# Patient Record
Sex: FEMALE | Race: WHITE
[De-identification: names, ages, dates, MRNs, and addresses within clinical notes are randomized per-mention and may not be internally consistent; named-entity substitution may affect disease eponyms.]

---

## 2017-01-03 NOTE — DIAGNOSTIC IMAGING REPORT
Brain MRI WITHOUT CONTRAST



HISTORY: Mental status change  G81.94 Left [id number]



TECHNIQUE: Multiplanar multisequence MRI of the brain was performed without the

use of contrast.



COMPARISON STUDY:  None.



FINDINGS: There are no areas of restricted diffusion to suggest acute

infarction. The midline structures are intact. The paranasal sinuses are clear.

The mastoid air cells are clear. The ventricles and sulci are within normal

limits for age. There is no mass, hematoma, midline shift. The major vascular

flow-voids at the skull base are well maintained. Moderate chronic small vessel

change consistent with age. Sella and parasellar regions are unremarkable.



IMPRESSION:  



1. No acute intracranial abnormality.





2. Mild to moderate chronic small vessel change throughout both cerebral

hemispheres.

3. No acute intracranial abnormality 







Electronically signed by:  Venancio Elias M.D.

1/3/2017 12:49 PM

## 2017-03-07 NOTE — SWALLOWING EVALUATION
HISTORY:  This 76 year-old woman was referred for a VFSS at [facility] 
in order to rule out aspiration.  The patient reports that she feels food and liquid 
getting "stuck" in her throat and that at times it causes her to cough.  Food that become 
stuck are more ticker or pasty type foods such as mashed potatoes and applesauce.  She 
also reports she will have episodes of vomiting associated with this.  While she eats, she 
needs to drink plenty of water.  The patient has a PMH significant for GERD.  She 
participated in an EGD on 6/27/16 which found a moderately sized Schatzki's ring (dilated) 
and a small hiatal hernia.  Her current diet is regular.  



PROCEDURE:  The patient was seen in the Radiology Department of [facility] for the VFSS.  Cursory examination of the oral cavity revealed natural dentition in 
fair condition.  Movement of the articulators was wnl.  



The patient was seated upright on a stool and was viewed in both the Anterior-Posterior 
(A-P) and Lateral planes.  Volitional phonation exercises completed in the A-P plane 
revealed bilateral vocal fold movement and vocal intensity to be wnl.  



In the lateral plane, the patient was given the following boluses:  1 tsp. thin liquid 
barium x 2, single swallow thin liquid barium self-presented from a cup, serial swallows 
of thin liquid barium self-presented via straw, 1 tsp. nectar-thick liquid barium, single 
swallow nectar-thick liquid barium self-presented from a cup, 1 tsp. barium pudding, and 1 
club cracker with barium paste.



The patient was then repositioned into the A-P plane and given the following boluses:  1 
tsp. nectar-thick liquid barium and 1 tsp. barium pudding.





RESULTS:  

Oral Stage:  Lip closure was adequate.  The patient was able to maintain a cohesive liquid 
bolus upon command during the liquid bolus hold task.  Mastication was slow and prolonged. 
  Lingual motion for bolus transport was also slow to initiate and disorganized.  
Piecemeal like deglutition was observed.   There was retention lining the tongue after the 
swallow.  The initiation of the pharyngeal swallow was delayed, and triggered when the 
bolus head reached the pyriforms.



Pharyngeal Stage:  Soft palate elevation was complete.  Laryngeal elevation revealed 
partial superior movement of the thyroid cartilage and partial approximation of the 
arytenoids to the epiglottic base.  Anterior hyoid excursion was partially reduced.   
Epiglottic deflection was incomplete and did not invert past the horizontal position.  
Laryngeal vestibular closure was incomplete, with a narrow column of air (scant amounts of 
contrast) being located in the vestibule at the height of the swallow.  The pharyngeal 
stripping wave was present and incomplete.  The opening to the pharyngoesophageal segment 
was also partially reduced, with partial distention and duration of the opening and 
partial obstruction of bolus flow.  Tongue base retraction was reduced, with a wide column 
of contrast being located between the tongue base and pharyngeal wall during the swallow.  
There was retention located along the tongue base, in the valleculae, along the 
aryepiglottic folds, and in the pyriforms after the swallow.  



There was no evidence of aspiration during this study. Scant amounts of laryngeal 
penetration were noted with thin liquids only.  The patient did have a weak swallow in 
general, with reduced ROM.  This resulted in retention throughout the pharynx after the 
initial swallow.  She did have good sensation to this, and elicited multiple effortful 
swallows to clear the pharynx.  This was effective in general, but needed 3-4 swallow to 
clear, in particular as viscosity increased.  A liquid wash also assisted.  



Esophageal stage:  There was evidence mid-distal esophageal retention.  A liquid wash did 
assist, but was not effective to completely clearing the retention.  Osteophytes were also 
present along with a suspected emerging cricopharyngeus impression.  



SUMMARY/RECOMMENDATIONS:  This patient presents mild-moderate oral-pharyngeal dysphagia.  
She also presents with esophageal dysfunction.  The following is recommended:

  1. Moist soft diet, thin liquids.  Use of sauces and gravies with meals.  

  2. Aspiration and GERD precautions, straws OK.  Fully upright for all p.o. intake and 
for 30-60 minutes after meals.  HOB elevated to at least 30 degrees at all times, even 
while asleep.  

  3. Safe swallow strategies:  Avoid foods of increased viscosity.  Alternate solids and 
liquids, rest breaks as needed.    

  4. Would benefit from outpatient speech therapy services for dysphagia, with focus on 
overall pharyngeal strengthening (to include but not limited to, Michelle maneuver, 
Mendelsohn Maneuver, effortful swallowing etc).  Would also benefit from further education 
and training on diet consistency and safe swallow strategies.  



A summary of the results and recommendations was discussed with the patient immediately 
following the study with verbal understanding.    



Thank you for referral of this patient.  Please contact me at [phone] if any 
additional information is needed.

## 2017-03-07 NOTE — DIAGNOSTIC IMAGING REPORT
VIDEO SWALLOW STUDY



CLINICAL HISTORY: Dysphagia.



COMPARISON STUDY: No priors.



Fluoroscopy time: 2.5 minutes.



FINDINGS: Fluoroscopic guidance was provided to the Department of Speech

Pathology and performing a video swallow study. The patient consumed

barium-impregnated pudding, cracker with paste, nectar thick liquid, and thin

barium while the swallowing mechanism was observed in real-time. No penetration

or aspiration was seen with any of the sampled textures. Esophageal dysmotility

is observed.





IMPRESSION:



1. No penetration or aspiration was identified with any of the sampled textures.



2. Esophageal dysmotility.



3. See dedicated speech pathology report for detailed findings and

recommendations.











Dictated:  3/7/2017 1:47 PM

Transcribed:  3/7/2017 2:01 PM

[place]







Electronically signed by:  Rich Robles M.D.

3/7/2017 2:38 PM



Dictated Date/Time:  3/7/2017 1:47 PM

## 2017-03-22 NOTE — CODING QUERY MEDICAL NECESSITY
SUPPORTING DIAGNOSIS NEEDED



Dr. Beard,



A supporting diagnosis is required for the test/procedure performed on this patient in 
order for us to be reimbursed by the patient's insurance. Please provide a supporting 
diagnosis for the following test/procedure listed below next to the test name along with 
your signature. 



*If there is no additional diagnosis for this patient that would support the following 
test/procedure please document that below next to the test/procedure.



Test(s)/Procedure(s) that require a supporting diagnosis:





* ([id number]) VITAMIN D ASSAY          DIAGNOSIS:





***DATE OF SERVICE: 3/7/17***





Provider Signature:  ______________________________  Date:  _______



Thank you  

Pavel Ridley

[facility] Information Management

Phone:  [phone]

Fax:  [phone]



Once completed, please kindly fax back to [phone]



For questions please call [phone]

## 2017-08-23 NOTE — MAMMOGRAPHY REPORT
BILATERAL DIGITAL SCREENING MAMMOGRAM WITH CAD: 8/23/2017

CLINICAL HISTORY: Routine screening.  Patient has no complaints.  





TECHNIQUE: Bilateral CC and MLO views were obtained.  Current study was also evaluated with a Compute
r Aided Detection (CAD) system.  



COMPARISON: Comparison is made to exams dated:  8/22/2016 mammogram, 8/20/2015 mammogram - [facility] [place], 2/26/2014 mammogram, 2/25/2013 mammogram, 10/24/2011 mammogram, and 10/22/2010 ma
mmogram.   



BREAST COMPOSITION:  The tissue of both breasts is heterogeneously dense, which may obscure small mas
ses.  



FINDINGS: There are scattered stable benign rim and round microcalcifications.  No new suspicious mas
s, architectural distortion or cluster of microcalcifications is seen.  



IMPRESSION:  ACR BI-RADS CATEGORY 1: NEGATIVE

There is no mammographic evidence of malignancy. A 1 year screening mammogram is recommended.  The pa
tient will receive written notification of the results.  





Approximately 10% of breast cancers are not detected with mammography. A negative mammographic report
 should not delay biopsy if a clinically suggestive mass is present.



Mitzy Veliz M.D.          

ay/:8/23/2017 10:37:54  



Imaging Technologist: Mary DA SILVA(R)(M), [facility]

letter sent: Normal 1/2  

BI-RADS Code: ACR BI-RADS Category 1: Negative

## 2017-12-04 ENCOUNTER — HOSPITAL ENCOUNTER (OUTPATIENT)
Dept: HOSPITAL 45 - C.MAMM | Age: 77
Discharge: HOME | End: 2017-12-04
Attending: FAMILY MEDICINE
Payer: COMMERCIAL

## 2017-12-04 DIAGNOSIS — M85.89: Primary | ICD-10-CM

## 2017-12-31 ENCOUNTER — HOSPITAL ENCOUNTER (EMERGENCY)
Dept: HOSPITAL 45 - C.EDB | Age: 77
Discharge: HOME | End: 2017-12-31
Payer: COMMERCIAL

## 2017-12-31 VITALS — DIASTOLIC BLOOD PRESSURE: 81 MMHG | HEART RATE: 93 BPM | SYSTOLIC BLOOD PRESSURE: 164 MMHG | OXYGEN SATURATION: 95 %

## 2017-12-31 VITALS
WEIGHT: 154.32 LBS | BODY MASS INDEX: 22.09 KG/M2 | BODY MASS INDEX: 22.09 KG/M2 | HEIGHT: 70 IN | HEIGHT: 70 IN | WEIGHT: 154.32 LBS

## 2017-12-31 VITALS — TEMPERATURE: 97.34 F

## 2017-12-31 DIAGNOSIS — Z87.891: ICD-10-CM

## 2017-12-31 DIAGNOSIS — N13.30: ICD-10-CM

## 2017-12-31 DIAGNOSIS — E87.6: ICD-10-CM

## 2017-12-31 DIAGNOSIS — N23: Primary | ICD-10-CM

## 2017-12-31 DIAGNOSIS — Z90.710: ICD-10-CM

## 2017-12-31 DIAGNOSIS — Z79.82: ICD-10-CM

## 2017-12-31 LAB
ALBUMIN SERPL-MCNC: 3.6 GM/DL (ref 3.4–5)
ALP SERPL-CCNC: 74 U/L (ref 45–117)
ALT SERPL-CCNC: 35 U/L (ref 12–78)
AST SERPL-CCNC: 26 U/L (ref 15–37)
BASOPHILS # BLD: 0.01 K/UL (ref 0–0.2)
BASOPHILS NFR BLD: 0.2 %
BUN SERPL-MCNC: 19 MG/DL (ref 7–18)
CALCIUM SERPL-MCNC: 8.6 MG/DL (ref 8.5–10.1)
CO2 SERPL-SCNC: 28 MMOL/L (ref 21–32)
CREAT SERPL-MCNC: 0.94 MG/DL (ref 0.6–1.2)
EOS ABS #: 0.2 K/UL (ref 0–0.5)
EOSINOPHIL NFR BLD AUTO: 195 K/UL (ref 130–400)
GLUCOSE SERPL-MCNC: 114 MG/DL (ref 70–99)
HCT VFR BLD CALC: 36.5 % (ref 37–47)
HGB BLD-MCNC: 12.7 G/DL (ref 12–16)
IG#: 0.01 K/UL (ref 0–0.02)
IMM GRANULOCYTES NFR BLD AUTO: 27 %
LIPASE: 62 U/L (ref 73–393)
LYMPHOCYTES # BLD: 1.42 K/UL (ref 1.2–3.4)
MCH RBC QN AUTO: 32.2 PG (ref 25–34)
MCHC RBC AUTO-ENTMCNC: 34.8 G/DL (ref 32–36)
MCV RBC AUTO: 92.4 FL (ref 80–100)
MONO ABS #: 0.27 K/UL (ref 0.11–0.59)
MONOCYTES NFR BLD: 5.1 %
NEUT ABS #: 3.34 K/UL (ref 1.4–6.5)
NEUTROPHILS # BLD AUTO: 3.8 %
NEUTROPHILS NFR BLD AUTO: 63.7 %
PMV BLD AUTO: 9.8 FL (ref 7.4–10.4)
POTASSIUM SERPL-SCNC: 3.4 MMOL/L (ref 3.5–5.1)
PROT SERPL-MCNC: 6.8 GM/DL (ref 6.4–8.2)
RED CELL DISTRIBUTION WIDTH CV: 12.7 % (ref 11.5–14.5)
RED CELL DISTRIBUTION WIDTH SD: 43 FL (ref 36.4–46.3)
SODIUM SERPL-SCNC: 140 MMOL/L (ref 136–145)
WBC # BLD AUTO: 5.25 K/UL (ref 4.8–10.8)

## 2017-12-31 NOTE — EMERGENCY ROOM VISIT NOTE
History


Report prepared by Kelsiibdenis:  Teresa Navarro


Under the Supervision of:  AMANDA TravisO.


First contact with patient:  16:11


Chief Complaint:  ABDOMINAL PAIN


Stated Complaint:  PAIN IN LOWER ABDOMEN QUADRANT, CHILLS, WEAK





History of Present Illness


The patient is a 77 year old female who presents to the Emergency Room with 

complaints of intermittent LLQ abdominal pain that started earlier this evening 

as she was cooking dinner. She rates her discomfort as a 5/10 in severity and 

describes it as feeling "achey" in nature. She reports the pain initially 

subsided, so she started to eat dinner, then she became weak, diaphoretic and 

started experiencing the chills. She also experienced nausea but has not 

vomited. She has undergone a hysterectomy in the past but still has her 

gallbladder and appendix. Her last BM was last night and normal. Patient denies 

headache, change in vision, fevers above 100.4 degrees, chest pain, shortness 

of breath, diarrhea, pain with urination, acute weakness in the arms or legs, 

rashes and melena. The patient also denies any recent sick contacts.





   Source of History:  patient


   Onset:  PTA


   Position:  abdomen (LLQ)


   Symptom Intensity:  5/10


   Quality:  ache


   Timing:  intermittent


   Associated Symptoms:  + chills, + diaphoresis, + nausea, + weakness (patient 

feels generally weak, but denies any acute weakness in the arms or legs), No 

fevers, No headache, No chest pain, No SOB, No vomiting, No melena, No diarrhea

, No urinary symptoms





Review of Systems


See HPI for pertinent positives & negatives. A total of 10 systems reviewed and 

were otherwise negative.





Past Medical & Surgical


Surgical Problems:


(1) History of hysterectomy








Social History


Smoking Status:  Former Smoker


Alcohol Use:  none


Drug Use:  none


Marital Status:  


Housing Status:  lives with family


Occupation Status:  retired





Current/Historical Medications


Scheduled


Aspirin (Aspirin Ec), 81 MG PO QPM


Biotin (Biotin), 1 TAB PO BID


Cholecalciferol (Vitamin D3), 1,000 UNITS PO DAILY


Cyanocobalamin (Vitamin B-12), 1,000 MCG PO MONDAY AND FRIDAY


Fish Oil (Rayne-3), 1 CAP PO BID


Levothyroxine Sodium (Levothyroxine Sodium), 75 MCG PO TUESDAY


Levothyroxine Sodium (Levothyroxine Sodium), 88 MCG PO EVERYDAY BUT TUESDAY


Multiple Vitamin (Multivitamin), 1 TAB PO QPM


Pantoprazole (Protonix), 40 MG PO QAM


Tamsulosin Hcl (Flomax), 0.4 MG PO DAILY


Trospium Chloride (Trospium Chloride), 20 MG PO DAILY





Scheduled PRN


Oxycodone Immediate Rel Tab (Roxicodone Ir), 5 MG PO Q6H PRN for Pain





Allergies


Coded Allergies:  


     No Known Allergies (Verified , 12/31/17)





Physical Exam


Vital Signs











  Date Time  Temp Pulse Resp B/P (MAP) Pulse Ox O2 Delivery O2 Flow Rate FiO2


 


12/31/17 19:40  93 18 164/81 95 Room Air  


 


12/31/17 17:53  90 17 161/81 96 Room Air  


 


12/31/17 16:08 36.3 76 17 157/80 100 Room Air  











Physical Exam


GENERAL: Sitting up in bed, alert, well appearing, well nourished, no distress, 

non-toxic 


EYE EXAM: normal conjunctiva. PERRL and EOM's intact.


OROPHARYNX: no exudate, no erythema, lips, buccal mucosa, and tongue normal and 

mucous membranes are moist


NECK: supple, no nuchal rigidity, no adenopathy, non-tender


LUNGS: Clear to auscultation. Normal chest wall mechanics


HEART: no murmurs, S1 normal and S2 normal 


ABDOMEN: abdomen soft, minimal tenderness in LLQ, normo-active bowel sounds, no 

masses, no rebound or guarding. 


BACK: Back is symmetrical on inspection and there is no deformity, no midline 

tenderness, no CVA tenderness. 


SKIN: no rashes and no bruising 


UPPER EXTREMITIES: upper extremities are grossly normal. 


LOWER EXTREMITIES: No pitting edema.


NEURO EXAM: Normal sensorium, cranial nerves II-XII intact, normal speech, no 

weakness of arms, no weakness of legs. No drift. Finger to nose intact. Gross 

sensation intact.





Medical Decision & Procedures


ER Provider


Diagnostic Interpretation:


Radiology results as stated below per my review and the radiologist's 

interpretation: 





ABD/PELVIS IV CONTRAST ONLY





CT DOSE: 306.14 mGy.cm





HISTORY: Pain  llq abd pain 





TECHNIQUE: Multiaxial CT images of the abdomen and pelvis were performed


following the use of intravenous contrast.  A dose lowering technique was


utilized adhering to the principles of ALARA.





COMPARISON STUDY: None.





FINDINGS: Mild bibasilar atelectasis. Liver is uniform throughout. Several small


gallstones are present within the gallbladder lumen.





Right kidney shows and neck to renal pelvis but is negative for hydronephrosis.





Left kidney shows hydronephrosis combine with extrarenal pelvis. There is a


small amount of perinephric fluid. Left ureter is distended as compared to the


right. This distention extends to the distal left ureter where a 4 x 3.4 mm


calculus is identified. This is approximately 2 cm proximal to the left


ureterovesical junction. Bladder is midline. There are no contained


calcifications.





Bowel pattern overall is nonobstructive. Appendix is unremarkable.





IMPRESSION: 





1. Obstructing calculus measuring 4 x 3.4 mm distal left ureter. This is 2 cm


from the left ureterovesical junction.


2. Significant left hydroureteronephrosis.


3. Mild/Moderate fluid extravasation to the left perinephric space suggesting


calyceal rupture


4. Several small gallstones within the gallbladder lumen.


5. Nonobstructive bowel pattern.


6. Normal appendix.





The above report was generated using voice recognition software.  It may contain


grammatical, syntax or spelling errors.





Electronically signed by:  Venancio Elias M.D.


12/31/2017 5:42 PM








KUB





CLINICAL HISTORY: stone for OP follow up nephrocalcinosis





COMPARISON STUDY:  CT same date 





FINDINGS: Residual contrast within the urinary tracts and left ureter. 4 mm


obstructing calculus distal left ureter. Nonobstructive bowel pattern.





IMPRESSION:  4 mm obstructing calculus distal left ureter. 





The above report was generated using voice recognition software.  It may contain


grammatical, syntax or spelling errors.





Electronically signed by:  Venancio Elias M.D.


12/31/2017 7:40 PM





Laboratory Results


12/31/17 15:25








Red Blood Count 3.95, Mean Corpuscular Volume 92.4, Mean Corpuscular Hemoglobin 

32.2, Mean Corpuscular Hemoglobin Concent 34.8, Mean Platelet Volume 9.8, 

Neutrophils (%) (Auto) 63.7, Lymphocytes (%) (Auto) 27.0, Monocytes (%) (Auto) 

5.1, Eosinophils (%) (Auto) 3.8, Basophils (%) (Auto) 0.2, Neutrophils # (Auto) 

3.34, Lymphocytes # (Auto) 1.42, Monocytes # (Auto) 0.27, Eosinophils # (Auto) 

0.20, Basophils # (Auto) 0.01





12/31/17 15:25

















Test


  12/31/17


15:25 12/31/17


17:15


 


White Blood Count


  5.25 K/uL


(4.8-10.8) 


 


 


Red Blood Count


  3.95 M/uL


(4.2-5.4) 


 


 


Hemoglobin


  12.7 g/dL


(12.0-16.0) 


 


 


Hematocrit 36.5 % (37-47)  


 


Mean Corpuscular Volume


  92.4 fL


() 


 


 


Mean Corpuscular Hemoglobin


  32.2 pg


(25-34) 


 


 


Mean Corpuscular Hemoglobin


Concent 34.8 g/dl


(32-36) 


 


 


Platelet Count


  195 K/uL


(130-400) 


 


 


Mean Platelet Volume


  9.8 fL


(7.4-10.4) 


 


 


Neutrophils (%) (Auto) 63.7 %  


 


Lymphocytes (%) (Auto) 27.0 %  


 


Monocytes (%) (Auto) 5.1 %  


 


Eosinophils (%) (Auto) 3.8 %  


 


Basophils (%) (Auto) 0.2 %  


 


Neutrophils # (Auto)


  3.34 K/uL


(1.4-6.5) 


 


 


Lymphocytes # (Auto)


  1.42 K/uL


(1.2-3.4) 


 


 


Monocytes # (Auto)


  0.27 K/uL


(0.11-0.59) 


 


 


Eosinophils # (Auto)


  0.20 K/uL


(0-0.5) 


 


 


Basophils # (Auto)


  0.01 K/uL


(0-0.2) 


 


 


RDW Standard Deviation


  43.0 fL


(36.4-46.3) 


 


 


RDW Coefficient of Variation


  12.7 %


(11.5-14.5) 


 


 


Immature Granulocyte % (Auto) 0.2 %  


 


Immature Granulocyte # (Auto)


  0.01 K/uL


(0.00-0.02) 


 


 


Anion Gap


  5.0 mmol/L


(3-11) 


 


 


Est Creatinine Clear Calc


Drug Dose 55.1 ml/min 


  


 


 


Estimated GFR (


American) 68.3 


  


 


 


Estimated GFR (Non-


American 58.9 


  


 


 


BUN/Creatinine Ratio 20.0 (10-20)  


 


Calcium Level


  8.6 mg/dl


(8.5-10.1) 


 


 


Total Bilirubin


  0.5 mg/dl


(0.2-1) 


 


 


Direct Bilirubin


  0.1 mg/dl


(0-0.2) 


 


 


Aspartate Amino Transf


(AST/SGOT) 26 U/L (15-37) 


  


 


 


Alanine Aminotransferase


(ALT/SGPT) 35 U/L (12-78) 


  


 


 


Alkaline Phosphatase


  74 U/L


() 


 


 


Total Protein


  6.8 gm/dl


(6.4-8.2) 


 


 


Albumin


  3.6 gm/dl


(3.4-5.0) 


 


 


Lipase


  62 U/L


() 


 


 


Urine Color  YELLOW 


 


Urine Appearance  CLEAR (CLEAR) 


 


Urine pH  6.5 (4.5-7.5) 


 


Urine Specific Gravity


  


  1.017


(1.000-1.030)


 


Urine Protein  NEG (NEG) 


 


Urine Glucose (UA)  NEG (NEG) 


 


Urine Ketones  TRACE (NEG) 


 


Urine Occult Blood  NEG (NEG) 


 


Urine Nitrite  NEG (NEG) 


 


Urine Bilirubin  NEG (NEG) 


 


Urine Urobilinogen  NEG (NEG) 


 


Urine Leukocyte Esterase  SMALL (NEG) 


 


Urine WBC (Auto)  1-5 /hpf (0-5) 


 


Urine RBC (Auto)  0-4 /hpf (0-4) 


 


Urine Hyaline Casts (Auto)  1-5 /lpf (0-5) 


 


Urine Epithelial Cells (Auto)


  


  10-20 /lpf


(0-5)


 


Urine Bacteria (Auto)  NEG (NEG) 





Laboratory results per my review.





Medications Administered











 Medications


  (Trade)  Dose


 Ordered  Sig/Geovanny


 Route  Start Time


 Stop Time Status Last Admin


Dose Admin


 


 Sodium Chloride  1,000 ml @ 


 999 mls/hr  Q1H1M STAT


 IV  12/31/17 16:23


 12/31/17 17:23 DC 12/31/17 16:28


999 MLS/HR


 


 Ondansetron HCl


  (Zofran Inj)  4 mg  NOW  STAT


 IV  12/31/17 16:45


 12/31/17 16:46 DC 12/31/17 16:52


4 MG


 


 Oxycodone HCl


  (Roxicodone


 Immediate Rel 5MG


 Home Pack)  1 homepack  UD  ONCE


 PO  12/31/17 19:15


 12/31/17 19:16 DC 12/31/17 19:39


1 HOMEPACK











ECG


Indication:  abdominal pain


Rate (beats per minute):  68


Rhythm:  sinus rhythm


Findings:  left axis deviation, no ectopy





ED Course


ED COURSE: 


Vital signs were reviewed and showed the patient is hypertensive. 


The patients medical record was reviewed


The above diagnostic studies were performed and reviewed.


ED treatments and interventions as stated above. 





1617: The patient was evaluated in room B2. A complete history and physical 

examination was performed.





1623: NSS 1000 ml @ 999 mls/hr IV. 





1645: Zofran 4 mg IV. 





1818: I discussed the patients case with Dr. Orosco, [facility]. He will 

follow up with the patient in the office. 





1910: Upon reevaluation, the patient is feeling much better. I discussed my 

findings with the patient and she understands and agrees with the treatment 

plan.   





1915: Oxycodone HCl 5 mg 1 homepack PO.  





Based on the patients age, coexisting illnesses, exam and lab findings the 

decision to treat as an outpatient was made.





The patient remained stable while under my care.





The patient appeared well at the time of discharge.





Medical Decision


Differential diagnoses includes but is not limited to gastritis, peptic ulcer 

disease, GERD, gallbladder disease, pancreatitis, small bowel obstruction, 

acute coronary syndrome, pericarditis, ischemic bowel, irritable bowel disease, 

irritable bowel syndrome, appendicitis, diverticulitis, malignancy, hernia, 

urinary tract infection, torsion, perforation, trauma, infectious. 





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 

lower quadrant abdominal pain associated with nausea, and diffuse weakness.  

She is completely neurologically intact.  On exam she has minimal tenderness.  

Vitals are unremarkable.  CBC all BMP, LFTs, bilirubin lipase was 

insignificant.  UA shows no infection.  CT abdomen and pelvis shows a 4 x 4 

millimeter stone 2 cm from the left UVJ with significant hydronephrosis and 

rupture of the left calyceal.  With these findings are consulted with urology.  

They recommended if the patient is pain-free or comfortable she can go home and 

follow-up on Tuesday.  Discussed with the patient and she declined any pain 

meds while in the ER.  There is no signs of infection.  Creatinine was 

unremarkable.  KUB was performed per the request of urology.  Patient was 

discharged on Flomax and OxyIR.  Discussed with care management and they will 

assist with follow-up on Tuesday with urology. Discussed with Pt concerning 

signs and symptoms to watch out for. Pt was instructed to follow up with their 

PCP and discussed with the patient their option to return to the ED at anytime 

for persistent or worsening symptoms. The appropriate anticipatory guidance and 

out-patient management, including indications for return to the emergency 

department, were explained at length to the patient and understood.





Medication Reconcilliation


Current Medication List:  was personally reviewed by me





Blood Pressure Screening


Patient's blood pressure:  Elevated blood pressure


Blood pressure disposition:  Referred to PCP





Consults


Time Called:  1815


Consulting Physician:  [facility]


Returned Call:  1818


I discussed the patients case with [facility]. He will follow up 

with the patient in the office.





Impression





 Primary Impression:  


 Renal colic on left side


 Additional Impressions:  


 Hydronephrosis


 Hypokalemia





Scribe Attestation


The scribe's documentation has been prepared under my direction and personally 

reviewed by me in its entirety. I confirm that the note above accurately 

reflects all work, treatment, procedures, and medical decision making performed 

by me.





Departure Information


Dispostion


Home / Self-Care





Prescriptions





Tamsulosin Hcl (FLOMAX) 0.4 Mg Cap


0.4 MG PO DAILY, #10 CAP


   Prov: Bobby Marino, DO         12/31/17 


Oxycodone Immediate Rel Tab (ROXICODONE IR) 5 Mg Tab


5 MG PO Q6H Y for Pain, #14 TAB


   Prov: Bobby Marino, DO         12/31/17





Referrals


No Doctor, Assigned (PCP)





Patient Instructions


ED Stone Renal W Colic, My [facility]





Additional Instructions





Please follow up with your primary care doctor with in the next 24 hours.  Any 

worsening of your symptoms, please return to the ED immediately. This includes 

any fevers greater than 100.4, worsening pain, chest pain, shortness breath, 

persistent nausea, vomiting, unable to eat or drink, or any other concerning 

signs or symptoms from your standpoint.





You were given medications during this visit that will inhibit your ability to 

drive, operate machinery and work. Please do NOT drive, operate machinery or 

work for the next 12hrs. You were also given a prescription for a narcotic. 

While taking this medication you should also not drive, operate machinery and 

or work. 








You have a 4 x 3 cm left distal ureteral stone with a large amount of swelling 

of her kidney and ureter.  If you have any fevers about 100.4 please return 

immediately to the ER.  You have any new pain or worsening pain please return.





Please call Tuesday morning to get an appointment and be seen by urology as 

soon as possible.





Problem Qualifiers








 Additional Impressions:  


 Hydronephrosis


 Hydronephrosis type:  unspecified  Qualified Codes:  N13.30 - Unspecified 

hydronephrosis

## 2017-12-31 NOTE — DIAGNOSTIC IMAGING REPORT
ABD/PELVIS IV CONTRAST ONLY



CT DOSE: 306.14 mGy.cm



HISTORY: Pain  llq abd pain 



TECHNIQUE: Multiaxial CT images of the abdomen and pelvis were performed

following the use of intravenous contrast.  A dose lowering technique was

utilized adhering to the principles of ALARA.





COMPARISON STUDY: None.



FINDINGS: Mild bibasilar atelectasis. Liver is uniform throughout. Several small

gallstones are present within the gallbladder lumen.



Right kidney shows and neck to renal pelvis but is negative for hydronephrosis.



Left kidney shows hydronephrosis combine with extrarenal pelvis. There is a

small amount of perinephric fluid. Left ureter is distended as compared to the

right. This distention extends to the distal left ureter where a 4 x 3.4 mm

calculus is identified. This is approximately 2 cm proximal to the left

ureterovesical junction. Bladder is midline. There are no contained

calcifications.



Bowel pattern overall is nonobstructive. Appendix is unremarkable.



IMPRESSION: 



1. Obstructing calculus measuring 4 x 3.4 mm distal left ureter. This is 2 cm

from the left ureterovesical junction.

2. Significant left hydroureteronephrosis.

3. Mild/Moderate fluid extravasation to the left perinephric space suggesting

calyceal rupture

4. Several small gallstones within the gallbladder lumen.

5. Nonobstructive bowel pattern.

6. Normal appendix.







The above report was generated using voice recognition software.  It may contain

grammatical, syntax or spelling errors.







Electronically signed by:  Venancio Elias M.D.

12/31/2017 5:42 PM



Dictated Date/Time:  12/31/2017 5:37 PM

## 2017-12-31 NOTE — DIAGNOSTIC IMAGING REPORT
KUB



CLINICAL HISTORY: stone for OP follow up nephrocalcinosis



COMPARISON STUDY:  CT same date 



FINDINGS: Residual contrast within the urinary tracts and left ureter. 4 mm

obstructing calculus distal left ureter. Nonobstructive bowel pattern.



IMPRESSION:  4 mm obstructing calculus distal left ureter. 













The above report was generated using voice recognition software.  It may contain

grammatical, syntax or spelling errors.







Electronically signed by:  Venancio Elias M.D.

12/31/2017 7:40 PM



Dictated Date/Time:  12/31/2017 7:39 PM

## 2018-02-06 ENCOUNTER — HOSPITAL ENCOUNTER (OUTPATIENT)
Dept: HOSPITAL 45 - C.RAD | Age: 78
Discharge: HOME | End: 2018-02-06
Attending: UROLOGY
Payer: COMMERCIAL

## 2018-02-06 DIAGNOSIS — N20.1: Primary | ICD-10-CM

## 2018-02-06 NOTE — DIAGNOSTIC IMAGING REPORT
KUB



CLINICAL HISTORY: 77 years-old Female presenting with N20.1 Ureteric stone. 



TECHNIQUE: Single supine view of the abdomen was obtained.



COMPARISON: 12/31/2017.



FINDINGS:

Moderate stool burden primarily in the right colon. Nonobstructive bowel gas

pattern. No gross pneumoperitoneum.



Atherosclerosis. Prominent splenic arterial calcifications noted.



Evaluation of the renal shadows is highly limited given the presence of

extensive stool. Pelvic phleboliths unchanged in distribution. The previously

noted distal left ureteral calculus is not radiographically apparent.



Osteopenia suspected. Mild degenerative changes in the lower lumbar spine.  



IMPRESSION:

1.  Moderate stool burden significantly degrades evaluation for renal calculi.



2.  The previously noted distal left ureteral calculus is not visualized on the

current radiograph.







Electronically signed by:  Matthew Reynolds M.D.

2/6/2018 8:41 AM



Dictated Date/Time:  2/6/2018 8:38 AM

## 2018-04-10 ENCOUNTER — HOSPITAL ENCOUNTER (OUTPATIENT)
Dept: HOSPITAL 45 - C.LAB | Age: 78
Discharge: HOME | End: 2018-04-10
Attending: FAMILY MEDICINE
Payer: COMMERCIAL

## 2018-04-10 DIAGNOSIS — E03.9: Primary | ICD-10-CM

## 2018-04-10 DIAGNOSIS — E55.9: ICD-10-CM

## 2018-04-10 DIAGNOSIS — M85.80: ICD-10-CM

## 2018-04-10 DIAGNOSIS — E53.8: ICD-10-CM

## 2018-04-10 DIAGNOSIS — R79.89: ICD-10-CM

## 2018-04-10 LAB
ALBUMIN SERPL-MCNC: 3.3 GM/DL (ref 3.4–5)
ALP SERPL-CCNC: 95 U/L (ref 45–117)
ALT SERPL-CCNC: 31 U/L (ref 12–78)
AST SERPL-CCNC: 25 U/L (ref 15–37)
BUN SERPL-MCNC: 15 MG/DL (ref 7–18)
CALCIUM SERPL-MCNC: 8.6 MG/DL (ref 8.5–10.1)
CO2 SERPL-SCNC: 30 MMOL/L (ref 21–32)
CREAT SERPL-MCNC: 0.8 MG/DL (ref 0.6–1.2)
EOSINOPHIL NFR BLD AUTO: 258 K/UL (ref 130–400)
GLUCOSE SERPL-MCNC: 91 MG/DL (ref 70–99)
HCT VFR BLD CALC: 38 % (ref 37–47)
HGB BLD-MCNC: 12.6 G/DL (ref 12–16)
MCH RBC QN AUTO: 30.2 PG (ref 25–34)
MCHC RBC AUTO-ENTMCNC: 33.2 G/DL (ref 32–36)
MCV RBC AUTO: 91.1 FL (ref 80–100)
PMV BLD AUTO: 10.4 FL (ref 7.4–10.4)
POTASSIUM SERPL-SCNC: 3.7 MMOL/L (ref 3.5–5.1)
PROT SERPL-MCNC: 6.6 GM/DL (ref 6.4–8.2)
RED CELL DISTRIBUTION WIDTH CV: 13.2 % (ref 11.5–14.5)
RED CELL DISTRIBUTION WIDTH SD: 43.8 FL (ref 36.4–46.3)
SODIUM SERPL-SCNC: 140 MMOL/L (ref 136–145)
WBC # BLD AUTO: 5.06 K/UL (ref 4.8–10.8)

## 2018-08-24 ENCOUNTER — HOSPITAL ENCOUNTER (OUTPATIENT)
Dept: HOSPITAL 45 - C.MAMM | Age: 78
Discharge: HOME | End: 2018-08-24
Attending: FAMILY MEDICINE
Payer: COMMERCIAL

## 2018-08-24 DIAGNOSIS — Z12.31: Primary | ICD-10-CM
